# Patient Record
Sex: FEMALE | Race: WHITE | NOT HISPANIC OR LATINO | URBAN - METROPOLITAN AREA
[De-identification: names, ages, dates, MRNs, and addresses within clinical notes are randomized per-mention and may not be internally consistent; named-entity substitution may affect disease eponyms.]

---

## 2020-07-09 ENCOUNTER — OFFICE VISIT (OUTPATIENT)
Dept: URBAN - METROPOLITAN AREA SURGERY CENTER 31 | Facility: SURGERY CENTER | Age: 65
End: 2020-07-09
Payer: COMMERCIAL

## 2020-07-09 DIAGNOSIS — Z86.010 H/O ADENOMATOUS POLYP OF COLON: ICD-10-CM

## 2020-07-09 PROCEDURE — G0105 COLORECTAL SCRN; HI RISK IND: HCPCS | Performed by: INTERNAL MEDICINE

## 2020-07-09 PROCEDURE — G8907 PT DOC NO EVENTS ON DISCHARG: HCPCS | Performed by: INTERNAL MEDICINE

## 2020-07-09 PROCEDURE — G9936 PMH PLYP/NEO CO/RECT/JUN/ANS: HCPCS | Performed by: INTERNAL MEDICINE

## 2020-07-09 RX ORDER — HYOSCYAMINE SULFATE 0.12 MG/1
PLACE 1 TABLET UNDER TONGUE BY TRANSLINGUAL ROUTE 3 TIMES A DAY FOR 30 DAYS TABLET, ORALLY DISINTEGRATING ORAL
Qty: 90 | Refills: 0 | Status: ACTIVE | COMMUNITY
Start: 1900-01-01 | End: 1900-01-01

## 2020-07-09 RX ORDER — CITALOPRAM HYDROBROMIDE 10 MG/1
TABLET, FILM COATED ORAL
Qty: 0 | Refills: 0 | Status: ACTIVE | COMMUNITY
Start: 1900-01-01 | End: 1900-01-01

## 2020-07-09 RX ORDER — CEVIMELINE 30 MG/1
TAKE 1 CAPSULE (30 MG) BY ORAL ROUTE 3 TIMES PER DAY CAPSULE ORAL
Qty: 0 | Refills: 0 | Status: ACTIVE | COMMUNITY
Start: 1900-01-01 | End: 1900-01-01

## 2020-07-09 RX ORDER — BUPRENORPHINE 7.5 UG/H
PATCH, EXTENDED RELEASE TRANSDERMAL
Qty: 0 | Refills: 0 | Status: ACTIVE | COMMUNITY
Start: 1900-01-01 | End: 1900-01-01

## 2020-07-09 RX ORDER — PREGABALIN 25 MG/1
TAKE 2 CAPSULES (50 MG) BY ORAL ROUTE 3 TIMES PER DAY CAPSULE ORAL
Qty: 0 | Refills: 0 | Status: ACTIVE | COMMUNITY
Start: 1900-01-01 | End: 1900-01-01

## 2020-07-09 RX ORDER — AMLODIPINE BESYLATE 5 MG/1
TABLET ORAL
Qty: 0 | Refills: 0 | Status: ACTIVE | COMMUNITY
Start: 1900-01-01 | End: 1900-01-01

## 2020-07-09 RX ORDER — ATENOLOL 50 MG/1
TABLET ORAL
Qty: 0 | Refills: 0 | Status: ACTIVE | COMMUNITY
Start: 1900-01-01 | End: 1900-01-01

## 2023-03-27 ENCOUNTER — TELEPHONE ENCOUNTER (OUTPATIENT)
Dept: URBAN - METROPOLITAN AREA CLINIC 19 | Facility: CLINIC | Age: 68
End: 2023-03-27

## 2023-03-30 ENCOUNTER — LAB OUTSIDE AN ENCOUNTER (OUTPATIENT)
Dept: URBAN - METROPOLITAN AREA CLINIC 19 | Facility: CLINIC | Age: 68
End: 2023-03-30

## 2023-03-30 ENCOUNTER — WEB ENCOUNTER (OUTPATIENT)
Dept: URBAN - METROPOLITAN AREA CLINIC 19 | Facility: CLINIC | Age: 68
End: 2023-03-30

## 2023-03-30 ENCOUNTER — OFFICE VISIT (OUTPATIENT)
Dept: URBAN - METROPOLITAN AREA CLINIC 19 | Facility: CLINIC | Age: 68
End: 2023-03-30
Payer: COMMERCIAL

## 2023-03-30 VITALS
OXYGEN SATURATION: 99 % | HEART RATE: 56 BPM | DIASTOLIC BLOOD PRESSURE: 72 MMHG | TEMPERATURE: 98.2 F | SYSTOLIC BLOOD PRESSURE: 130 MMHG | BODY MASS INDEX: 28.9 KG/M2 | HEIGHT: 60 IN | WEIGHT: 147.2 LBS

## 2023-03-30 DIAGNOSIS — Z86.010 PERSONAL HISTORY OF COLONIC POLYPS: ICD-10-CM

## 2023-03-30 DIAGNOSIS — K59.09 CHRONIC CONSTIPATION: ICD-10-CM

## 2023-03-30 DIAGNOSIS — K57.92 DIVERTICULITIS: ICD-10-CM

## 2023-03-30 DIAGNOSIS — K63.9 COLON WALL THICKENING: ICD-10-CM

## 2023-03-30 PROBLEM — 307496006: Status: ACTIVE | Noted: 2023-03-30

## 2023-03-30 PROCEDURE — 99214 OFFICE O/P EST MOD 30 MIN: CPT | Performed by: NURSE PRACTITIONER

## 2023-03-30 RX ORDER — CIPROFLOXACIN 250 MG/1
1 TABLET TABLET, FILM COATED ORAL
Status: ACTIVE | COMMUNITY

## 2023-03-30 RX ORDER — ATENOLOL 50 MG/1
TABLET ORAL
Qty: 0 | Refills: 0 | Status: ACTIVE | COMMUNITY
Start: 1900-01-01

## 2023-03-30 RX ORDER — AMLODIPINE BESYLATE 5 MG/1
TABLET ORAL
Qty: 0 | Refills: 0 | Status: ACTIVE | COMMUNITY
Start: 1900-01-01

## 2023-03-30 RX ORDER — TRAMADOL HYDROCHLORIDE 50 MG/1
1 TABLET AS NEEDED TABLET, FILM COATED ORAL ONCE A DAY
Status: ACTIVE | COMMUNITY

## 2023-03-30 RX ORDER — HYOSCYAMINE SULFATE 0.12 MG/1
PLACE 1 TABLET UNDER TONGUE BY TRANSLINGUAL ROUTE 3 TIMES A DAY FOR 30 DAYS TABLET, ORALLY DISINTEGRATING ORAL
Qty: 90 | Refills: 0 | Status: ACTIVE | COMMUNITY
Start: 1900-01-01

## 2023-03-30 RX ORDER — PREGABALIN 25 MG/1
TAKE 2 CAPSULES (50 MG) BY ORAL ROUTE 3 TIMES PER DAY CAPSULE ORAL
Qty: 0 | Refills: 0 | Status: ACTIVE | COMMUNITY
Start: 1900-01-01

## 2023-03-30 RX ORDER — CITALOPRAM HYDROBROMIDE 10 MG/1
TABLET, FILM COATED ORAL
Qty: 0 | Refills: 0 | Status: ACTIVE | COMMUNITY
Start: 1900-01-01

## 2023-03-30 RX ORDER — BUPRENORPHINE 7.5 UG/H
PATCH, EXTENDED RELEASE TRANSDERMAL
Qty: 0 | Refills: 0 | Status: ACTIVE | COMMUNITY
Start: 1900-01-01

## 2023-03-30 RX ORDER — METRONIDAZOLE 500 MG/1
1 TABLET TABLET ORAL THREE TIMES A DAY
Status: ACTIVE | COMMUNITY

## 2023-03-30 RX ORDER — CEVIMELINE 30 MG/1
TAKE 1 CAPSULE (30 MG) BY ORAL ROUTE 3 TIMES PER DAY CAPSULE ORAL
Qty: 0 | Refills: 0 | Status: ACTIVE | COMMUNITY
Start: 1900-01-01

## 2023-03-30 NOTE — HPI-TODAY'S VISIT:
Ms. Coppola is a 67-year-old female with PMH of CVA 2012, Sjogren's, HTN/HLD, OA, psoriatic arthritiswho presents today for ED follow-up.  Presented to [facility] on 3/23/2023 with complaints of rectal bleeding and left-sided abdominal pain.  No fever or chills.  No chest pain or shortness of breath.  WBC 12.3, H&H 14.0/40.5, platelets 232.  CT showed diverticulitis and she was started on a prescription of Cipro/Flagyl x 10 day course and tramadol for pain she is not on any blood thinners.  INR stable at 1.0.  CMP unremarkable  CT: Suspected diverticulitis in the region of the splenic flexure of the colon with additional prominent wall thickening possibly due to inflammatory changes, however underlying mass is not excluded.  When the patient is clinically able further follow-up with colonoscopy recommended.  Prominence of the common bile duct and central intrahepatic biliary tree without discrete masses or strictures seen likely reflecting postcholecystectomy state.  If there is clinical concern for biliary ductal pathology further evaluation with nuclear medicine hepatobiliary HIDA scanning could be considered.  She reports pain is better and diarrhea has stopped but feeling nauseated with the antibiotics. No bloody or black stools. Eating small amounts and staying adequately hydrated.   Last colonoscopy was done by Dr. Dodd on 7/9/2020.  A hemostatic clip was found in the cecum are at the site of prior large polyp removal-removed using snare.  Oozing from the clip removal site was treated with a hot biopsy forceps.  Diverticulosis in the sigmoid and descending colon.  Nonbleeding internal hemorrhoids.  No specimens collected.  Repeat colonoscopy in 3 years for surveillance.

## 2023-05-19 ENCOUNTER — CLAIMS CREATED FROM THE CLAIM WINDOW (OUTPATIENT)
Dept: URBAN - METROPOLITAN AREA CLINIC 4 | Facility: CLINIC | Age: 68
End: 2023-05-19
Payer: COMMERCIAL

## 2023-05-19 ENCOUNTER — OFFICE VISIT (OUTPATIENT)
Dept: URBAN - METROPOLITAN AREA SURGERY CENTER 31 | Facility: SURGERY CENTER | Age: 68
End: 2023-05-19

## 2023-05-19 ENCOUNTER — CLAIMS CREATED FROM THE CLAIM WINDOW (OUTPATIENT)
Dept: URBAN - METROPOLITAN AREA SURGERY CENTER 31 | Facility: SURGERY CENTER | Age: 68
End: 2023-05-19
Payer: COMMERCIAL

## 2023-05-19 DIAGNOSIS — D12.3 ADENOMA OF TRANSVERSE COLON: ICD-10-CM

## 2023-05-19 DIAGNOSIS — Z86.010 ADENOMAS PERSONAL HISTORY OF COLONIC POLYPS: ICD-10-CM

## 2023-05-19 DIAGNOSIS — K63.5 BENIGN COLON POLYP: ICD-10-CM

## 2023-05-19 DIAGNOSIS — K63.89 OTHER SPECIFIED DISEASES OF INTESTINE: ICD-10-CM

## 2023-05-19 PROCEDURE — 45385 COLONOSCOPY W/LESION REMOVAL: CPT | Performed by: INTERNAL MEDICINE

## 2023-05-19 PROCEDURE — 45380 COLONOSCOPY AND BIOPSY: CPT | Performed by: INTERNAL MEDICINE

## 2023-05-19 PROCEDURE — G8907 PT DOC NO EVENTS ON DISCHARG: HCPCS | Performed by: INTERNAL MEDICINE

## 2023-05-19 PROCEDURE — 88305 TISSUE EXAM BY PATHOLOGIST: CPT | Performed by: PATHOLOGY

## 2023-06-13 ENCOUNTER — DASHBOARD ENCOUNTERS (OUTPATIENT)
Age: 68
End: 2023-06-13

## 2023-06-13 ENCOUNTER — OFFICE VISIT (OUTPATIENT)
Dept: URBAN - METROPOLITAN AREA CLINIC 19 | Facility: CLINIC | Age: 68
End: 2023-06-13
Payer: COMMERCIAL

## 2023-06-13 VITALS
TEMPERATURE: 97.3 F | DIASTOLIC BLOOD PRESSURE: 71 MMHG | BODY MASS INDEX: 28 KG/M2 | WEIGHT: 142.6 LBS | HEIGHT: 60 IN | SYSTOLIC BLOOD PRESSURE: 126 MMHG

## 2023-06-13 DIAGNOSIS — Z86.010 HISTORY OF COLON POLYPS: ICD-10-CM

## 2023-06-13 DIAGNOSIS — K57.30 DIVERTICULOSIS OF COLON WITHOUT DIVERTICULITIS: ICD-10-CM

## 2023-06-13 PROBLEM — 398311004: Status: ACTIVE | Noted: 2023-06-13

## 2023-06-13 PROBLEM — 428283002: Status: ACTIVE | Noted: 2023-06-13

## 2023-06-13 PROCEDURE — 99212 OFFICE O/P EST SF 10 MIN: CPT | Performed by: INTERNAL MEDICINE

## 2023-06-13 RX ORDER — METRONIDAZOLE 500 MG/1
1 TABLET TABLET ORAL THREE TIMES A DAY
COMMUNITY

## 2023-06-13 RX ORDER — PREGABALIN 25 MG/1
TAKE 2 CAPSULES (50 MG) BY ORAL ROUTE 3 TIMES PER DAY CAPSULE ORAL
Qty: 0 | Refills: 0 | COMMUNITY
Start: 1900-01-01

## 2023-06-13 RX ORDER — HYOSCYAMINE SULFATE 0.12 MG/1
PLACE 1 TABLET UNDER TONGUE BY TRANSLINGUAL ROUTE 3 TIMES A DAY FOR 30 DAYS TABLET, ORALLY DISINTEGRATING ORAL
Qty: 90 | Refills: 0 | COMMUNITY
Start: 1900-01-01

## 2023-06-13 RX ORDER — TRAMADOL HYDROCHLORIDE 50 MG/1
1 TABLET AS NEEDED TABLET, FILM COATED ORAL ONCE A DAY
COMMUNITY

## 2023-06-13 RX ORDER — CIPROFLOXACIN 250 MG/1
1 TABLET TABLET, FILM COATED ORAL
COMMUNITY

## 2023-06-13 RX ORDER — CITALOPRAM HYDROBROMIDE 10 MG/1
TABLET, FILM COATED ORAL
Qty: 0 | Refills: 0 | COMMUNITY
Start: 1900-01-01

## 2023-06-13 RX ORDER — BUPRENORPHINE 7.5 UG/H
PATCH, EXTENDED RELEASE TRANSDERMAL
Qty: 0 | Refills: 0 | COMMUNITY
Start: 1900-01-01

## 2023-06-13 RX ORDER — ATENOLOL 50 MG/1
TABLET ORAL
Qty: 0 | Refills: 0 | COMMUNITY
Start: 1900-01-01

## 2023-06-13 RX ORDER — CEVIMELINE 30 MG/1
TAKE 1 CAPSULE (30 MG) BY ORAL ROUTE 3 TIMES PER DAY CAPSULE ORAL
Qty: 0 | Refills: 0 | COMMUNITY
Start: 1900-01-01

## 2023-06-13 RX ORDER — AMLODIPINE BESYLATE 5 MG/1
TABLET ORAL
Qty: 0 | Refills: 0 | COMMUNITY
Start: 1900-01-01

## 2023-06-13 NOTE — HPI-TODAY'S VISIT:
Ms. Cpopola is a 67-year-old female with PMH of CVA 2012, Sjogren's, HTN/HLD, OA, psoriatic arthritiswho presents today for ED follow-up.  Presented to [facility] on 3/23/2023 with complaints of rectal bleeding and left-sided abdominal pain.  No fever or chills.  No chest pain or shortness of breath.  WBC 12.3, H&H 14.0/40.5, platelets 232.  CT showed diverticulitis and she was started on a prescription of Cipro/Flagyl x 10 day course and tramadol for pain she is not on any blood thinners.  INR stable at 1.0.  CMP unremarkable  CT: Suspected diverticulitis in the region of the splenic flexure of the colon with additional prominent wall thickening possibly due to inflammatory changes, however underlying mass is not excluded.  When the patient is clinically able further follow-up with colonoscopy recommended.  Prominence of the common bile duct and central intrahepatic biliary tree without discrete masses or strictures seen likely reflecting postcholecystectomy state.  If there is clinical concern for biliary ductal pathology further evaluation with nuclear medicine hepatobiliary HIDA scanning could be considered.  She reports pain is better and diarrhea has stopped but feeling nauseated with the antibiotics. No bloody or black stools. Eating small amounts and staying adequately hydrated.   Last colonoscopy was done by Dr. Dodd on 7/9/2020.  A hemostatic clip was found in the cecum are at the site of prior large polyp removal-removed using snare.  Oozing from the clip removal site was treated with a hot biopsy forceps.  Diverticulosis in the sigmoid and descending colon.  Nonbleeding internal hemorrhoids.  No specimens collected.  Repeat colonoscopy in 3 years for surveillance.  6/13/23  She is here for followup. Has mild constipation- takes stool softener. Has managed to lose some weight. Colonoscopy 3 small polyps removed- sessile serrated adenoma.  Feels well overall.